# Patient Record
Sex: FEMALE | Race: WHITE | URBAN - METROPOLITAN AREA
[De-identification: names, ages, dates, MRNs, and addresses within clinical notes are randomized per-mention and may not be internally consistent; named-entity substitution may affect disease eponyms.]

---

## 2018-10-18 ENCOUNTER — OFFICE VISIT (OUTPATIENT)
Dept: ENT CLINIC | Age: 58
End: 2018-10-18
Payer: COMMERCIAL

## 2018-10-18 VITALS
DIASTOLIC BLOOD PRESSURE: 71 MMHG | WEIGHT: 266 LBS | BODY MASS INDEX: 40.32 KG/M2 | HEIGHT: 68 IN | SYSTOLIC BLOOD PRESSURE: 121 MMHG | HEART RATE: 92 BPM | TEMPERATURE: 97.4 F

## 2018-10-18 DIAGNOSIS — H93.11 TINNITUS OF RIGHT EAR: Primary | ICD-10-CM

## 2018-10-18 PROCEDURE — 99203 OFFICE O/P NEW LOW 30 MIN: CPT | Performed by: OTOLARYNGOLOGY

## 2018-10-18 RX ORDER — DEXLANSOPRAZOLE 60 MG/1
60 CAPSULE, DELAYED RELEASE ORAL EVERY OTHER DAY
COMMUNITY

## 2018-10-18 RX ORDER — LORAZEPAM 1 MG/1
1 TABLET ORAL EVERY 6 HOURS PRN
COMMUNITY
End: 2019-11-11

## 2018-10-18 RX ORDER — ATORVASTATIN CALCIUM 10 MG/1
10 TABLET, FILM COATED ORAL DAILY
COMMUNITY

## 2018-10-18 RX ORDER — AMOXICILLIN 500 MG
CAPSULE ORAL
COMMUNITY

## 2018-10-18 RX ORDER — METHYLPREDNISOLONE 4 MG/1
TABLET ORAL
Qty: 1 KIT | Refills: 0 | Status: SHIPPED | OUTPATIENT
Start: 2018-10-18 | End: 2019-11-11

## 2018-10-18 RX ORDER — LISINOPRIL AND HYDROCHLOROTHIAZIDE 25; 20 MG/1; MG/1
1 TABLET ORAL DAILY
COMMUNITY

## 2018-10-18 RX ORDER — PHENOL 1.4 %
1 AEROSOL, SPRAY (ML) MUCOUS MEMBRANE DAILY
COMMUNITY
End: 2019-11-11

## 2018-10-18 NOTE — PROGRESS NOTES
CHIEF COMPLAINT: Tinnitus    HISTORY OF PRESENT ILLNESS:  62 y.o. female who presents with tinnitus right week. 1 week duration. Sudden onset. Steady, nonpulsatile. No associated URI. Hearing is not affected. Has some fullness right ear. No associated dizziness. PAST MEDICAL HISTORY:   History   Smoking Status    Never Smoker   Smokeless Tobacco    Never Used                                                    History   Alcohol Use    Yes                                                    Current Outpatient Prescriptions:     dexlansoprazole (DEXILANT) 60 MG CPDR delayed release capsule, Take 60 mg by mouth daily, Disp: , Rfl:     atorvastatin (LIPITOR) 10 MG tablet, Take 10 mg by mouth daily, Disp: , Rfl:     lisinopril-hydrochlorothiazide (PRINZIDE;ZESTORETIC) 20-25 MG per tablet, Take 1 tablet by mouth daily, Disp: , Rfl:     Omega-3 Fatty Acids (FISH OIL) 1200 MG CAPS, Take by mouth, Disp: , Rfl:     vitamin D (CHOLECALCIFEROL) 1000 UNIT TABS tablet, Take 1,000 Units by mouth daily, Disp: , Rfl:     LORazepam (ATIVAN) 1 MG tablet, Take 1 mg by mouth every 6 hours as needed for Anxiety (when travels). ., Disp: , Rfl:     calcium carbonate 600 MG TABS tablet, Take 1 tablet by mouth daily, Disp: , Rfl:                                                  Past Medical History:   Diagnosis Date    Arthritis     GERD (gastroesophageal reflux disease)     Hypertension     Tinnitus                                                     Past Surgical History:   Procedure Laterality Date    CHOLECYSTECTOMY           REVIEW OF SYSTEMS:  All pertinent positive and negative review of systems included in HPI. Otherwise, all systems are reviewed and negative.     PHYSICAL EXAMINATION:   GENERAL: [id number]- no acute distress  COMMUNICATION :  Normal voice  MENTAL STATUS:  Normal  HEAD AND FACE:  Normal  EXTERNAL EARS AND NOSE:  Normal  FACIAL MUSCLES:  Normal  EXTRAOCULAR MUSCLES: Intact  FACE PALPATION:

## 2019-11-11 ENCOUNTER — OFFICE VISIT (OUTPATIENT)
Dept: ENT CLINIC | Age: 59
End: 2019-11-11
Payer: COMMERCIAL

## 2019-11-11 VITALS
WEIGHT: 269.4 LBS | HEART RATE: 92 BPM | SYSTOLIC BLOOD PRESSURE: 111 MMHG | TEMPERATURE: 98.7 F | DIASTOLIC BLOOD PRESSURE: 67 MMHG | BODY MASS INDEX: 40.83 KG/M2 | HEIGHT: 68 IN

## 2019-11-11 DIAGNOSIS — H69.81 ETD (EUSTACHIAN TUBE DYSFUNCTION), RIGHT: ICD-10-CM

## 2019-11-11 PROCEDURE — 99203 OFFICE O/P NEW LOW 30 MIN: CPT | Performed by: OTOLARYNGOLOGY

## 2019-11-11 RX ORDER — FLUTICASONE PROPIONATE 50 MCG
1 SPRAY, SUSPENSION (ML) NASAL 2 TIMES DAILY
Qty: 1 BOTTLE | Refills: 1 | Status: SHIPPED | OUTPATIENT
Start: 2019-11-11

## 2019-11-11 RX ORDER — AMOXICILLIN 500 MG/1
TABLET, FILM COATED ORAL
COMMUNITY
End: 2019-11-11

## 2019-11-11 RX ORDER — MAGNESIUM 30 MG
30 TABLET ORAL 2 TIMES DAILY
COMMUNITY

## 2019-11-13 ENCOUNTER — TELEPHONE (OUTPATIENT)
Dept: ENT CLINIC | Age: 59
End: 2019-11-13